# Patient Record
(demographics unavailable — no encounter records)

---

## 2025-02-17 NOTE — PLAN
[FreeTextEntry1] :   Patient to continue with present medications - all medications reconciled/reviewed during this visit.   Increase fluid intake. RTO in 1 year for annual physical At least 45 minutes was spent with patient face to face examining and reviewing findings/results during this visit. Ample time was provided to answer any questions or address concerns to the patients satisfaction.   I, Sylvester Stauffer, attest that this documentation has been prepared under the direction and in the presence of Provider Earnest Ahumada DNP  The documentation recorded by the scribe, in my presence, accurately reflects the service I personally performed, and the decisions made by me with my edits as appropriate. Earnest Ahumada DNP

## 2025-02-17 NOTE — HISTORY OF PRESENT ILLNESS
[FreeTextEntry1] : Establish care [de-identified] :  Patient encounter today to establish care. States he has been doing well. Denies any CP, SOB or diff breathing. No recent fever, chills, cough or cold type symptoms. Has no other complaints at this time.